# Patient Record
Sex: FEMALE | Race: BLACK OR AFRICAN AMERICAN | NOT HISPANIC OR LATINO | Employment: OTHER | URBAN - METROPOLITAN AREA
[De-identification: names, ages, dates, MRNs, and addresses within clinical notes are randomized per-mention and may not be internally consistent; named-entity substitution may affect disease eponyms.]

---

## 2017-02-22 ENCOUNTER — HISTORICAL (OUTPATIENT)
Dept: RADIOLOGY | Facility: HOSPITAL | Age: 62
End: 2017-02-22

## 2020-12-12 ENCOUNTER — HISTORICAL (OUTPATIENT)
Dept: ADMINISTRATIVE | Facility: HOSPITAL | Age: 65
End: 2020-12-12

## 2021-07-04 ENCOUNTER — HISTORICAL (OUTPATIENT)
Dept: ADMINISTRATIVE | Facility: HOSPITAL | Age: 66
End: 2021-07-04

## 2022-05-03 NOTE — HISTORICAL [facility]
This is a historical note converted from [place]. Formatting and pictures may have been removed.  Please reference [place] for original formatting and attached multimedia. Chief Complaint  Left shoulder pain/throbbing, limited ROM. non traumatic.  History of Present Illness  Left arm and shoulder pain. x one week. reports hx arthritis. reports came to this clinic in december for ankle pain/sprain and was rxed mobic and cyclobenzaprine. the mobic helped. states she had some left but assumed it was .  Review of Systems  Constitutional:?negative except as stated in HPI  Eye:?negative except as stated in HPI  ENMT:?negative except as stated in HPI  Respiratory:?negative except as stated in HPI  Cardiovascular:?negative except as stated in HPI  Gastrointestinal:?negative except as stated in HPI  Genitourinary:?negative except as stated in HPI  Hema/Lymph:?negative except as stated in HPI  Endocrine:?negative except as stated in HPI  Musculoskeletal:?negative except as stated in HPI  Integumentary:?negative except as stated in HPI  Neurologic: negative except as stated in HPI  Physical Exam  Vitals & Measurements  T:?36.4? ?C (Oral)? HR:?92(Peripheral)? RR:?18? BP:?130/86? SpO2:?98%?  HT:?160.00?cm? WT:?74.600?kg? BMI:?29.14?  General:? in no acute distress  Eye: PERRLA, EOMI, clear conjunctiva, eyelids normal  Neck: full range of motion, no thyromegaly  Cardiovascular:?left radial pulse, left UE CMS intact.  Musculoskeletal:?active ROM with lateral arm raise limited to approx 30 degrees. no pain or limitation with anterior arm raise or?posterior flexion. pain with palpation of the deltoid, trapezius, anterior/posterior shoulder joint.  Integumentary: no rashes or skin lesions visible  Neurologic: cranial nerves?grossly?intact, no signs of peripheral neurological deficit  Assessment/Plan  1.?Osteoarthritis?M19.90  ?toradol in clinic. XR shows degenerative changes/arthritis. mobic Rx start TOMORROW. if pain med needed  later today, take tylenol arthritis. pt understands. referred to ortho.  Ordered:  meloxicam, 15 mg = 1 tab(s), Oral, Daily, do not take with ibuprofen, naproxen. take with food and plenty of water., # 30 tab(s), 0 Refill(s), Pharmacy: [facility], 160, cm, Height/Length Dosing, 07/04/21 11:49:00 CDT, 74.6, kg, Weight Dosing, 07/04/21...  injections IM/SQ, 07/04/21 12:07:00 CDT, 07/04/21 12:07:00 CDT, Osteoarthritis  Pain of left shoulder region  Office/Outpatient Visit Level 4 Established [id number] PC, Osteoarthritis  Pain of left shoulder region, 07/04/21 13:09:00 CDT  ?  2.?Pain of left shoulder region?M25.512  ?as above  Ordered:  meloxicam, 15 mg = 1 tab(s), Oral, Daily, do not take with ibuprofen, naproxen. take with food and plenty of water., # 30 tab(s), 0 Refill(s), Pharmacy: [facility], 160, cm, Height/Length Dosing, 07/04/21 11:49:00 CDT, 74.6, kg, Weight Dosing, 07/04/21...  injections IM/SQ, 07/04/21 12:07:00 CDT, 07/04/21 12:07:00 CDT, Osteoarthritis  Pain of left shoulder region  Office/Outpatient Visit Level 4 Established [id number] PC, Osteoarthritis  Pain of left shoulder region, 07/04/21 13:09:00 CDT  ?  Referrals  [facility] Internal Referral to Orthopedics Clinic, Specialty: Sports Medicine, Reason: left shoulder pain, rated 10/10. non traumatic/no injury. sudden onset approx 1 week ago. limited ROM., Type: toradol, mobic, tylenol arthritis, ice/heat, Start: 07/04/21 13:04:00 CDT   Problem List/Past Medical History  Ongoing  DM (diabetes mellitus)  HTN (hypertension)  Hyperlipidemia  Tobacco user  Historical  No qualifying data  Procedure/Surgical History  FESS - Functional endoscopic sinus surgery  Tonsillectomy  Tubal ligation   Medications  amLODIPine 5 mg oral tablet, 5 mg= 1 tab(s), Oral, Daily  atorvastatin 80 mg oral tablet, 80 mg= 1 tab(s), Oral, Daily  glipiZIDE 10 mg oral tablet, 10 mg= 1 tab(s), Oral, BID  HumaLOG KwikPen 100 units/mL injectable solution,  Subcutaneous  hydrochlorothiazide-lisinopril 25 mg-20 mg oral tablet, 1 tab(s), Oral, Daily, 3 refills  Januvia 100 mg oral tablet, 100 mg= 1 tab(s), Oral, Daily  Lantus Solostar Pen 100 units/mL subcutaneous solution, 40 units= 40 units, Subcutaneous, Daily  meloxicam 15 mg oral tablet, 15 mg= 1 tab(s), Oral, Daily  Allergies  penicillins?(Rash)  Social History  Abuse/Neglect  No, No, Yes, 07/04/2021  Alcohol  Past, 04/15/2016  Employment/School  Employed, 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 Exercise type: Walking., 04/15/2016  Financial/Legal Situation  None, 12/12/2020  Home/Environment  Lives with Alone. Living situation: Home/Independent., 04/15/2016    Never in , 12/12/2020  Nutrition/Health  Diabetic, 04/15/2016  Spiritual/Cultural  Anabaptist, 12/12/2020  Substance Use  Never, 04/15/2016  Tobacco  10 or more cigarettes (1/2 pack or more)/day in last 30 days, Cigarettes, No, 20 per day., 07/04/2021  Family History  Acute myocardial infarction.: Mother.  Health Maintenance  Health Maintenance  ???Pending?(in the next year)  ??? ??OverDue  ??? ? ? ?Smoking Cessation due??01/01/21??Variable frequency  ??? ? ? ?Alcohol Misuse Screening due??01/02/21??and every 1??year(s)  ??? ??Due?  ??? ? ? ?Tetanus Vaccine due??06/10/21??and every 10??year(s)  ??? ? ? ?Aspirin Therapy for CVD Prevention due??07/04/21??and every 1??year(s)  ??? ? ? ?Colorectal Screening due??07/04/21??Unknown Frequency  ??? ? ? ?Diabetes Maintenance-Eye Exam due??07/04/21??Unknown Frequency  ??? ? ? ?Diabetes Maintenance-Foot Exam due??07/04/21??Unknown Frequency  ??? ? ? ?Diabetes Maintenance-HgbA1c due??07/04/21??Unknown Frequency  ??? ? ? ?Diabetes Screening due??07/04/21??Unknown Frequency  ??? ? ? ?Hypertension Management-BMP due??07/04/21??Unknown Frequency  ??? ? ? ?Lung Cancer Screening due??07/04/21??and every 1??year(s)  ??? ? ? ?Zoster Vaccine  due??07/04/21??Unknown Frequency  ??? ??Due In Future?  ??? ? ? ?Obesity Screening not due until??01/01/22??and every 1??year(s)  ??? ? ? ?Cervical Cancer Screening not due until??06/30/22??and every 3??year(s)  ???Satisfied?(in the past 1 year)  ??? ??Satisfied?  ??? ? ? ?ADL Screening on??07/04/21.??Satisfied by Madisyn Harris LPN  ??? ? ? ?Alcohol Misuse Screening on??12/12/20.??Satisfied by Ninoska Randolph LPN.  ??? ? ? ?Blood Pressure Screening on??07/04/21.??Satisfied by Madisyn Harris LPN  ??? ? ? ?Body Mass Index Check on??07/04/21.??Satisfied by Madisyn Harris LPN  ??? ? ? ?Depression Screening on??07/04/21.??Satisfied by Madisyn Harris LPN  ??? ? ? ?Hypertension Management-Blood Pressure on??07/04/21.??Satisfied by Madisyn Harris LPN  ??? ? ? ?Obesity Screening on??07/04/21.??Satisfied by Madisyn Harris LPN  ??? ??Postponed?  ??? ? ? ?Tetanus Vaccine on??12/12/20.??Recorded by Ninoska Randolph LPN??Reason: Postpone due to refusal  ??? ??Refused?  ??? ? ? ?Influenza Vaccine on??12/12/20.??Recorded by Ninoska Randolph LPN??Reason: Patient Refuses  ??? ? ? ?Zoster Vaccine on??12/12/20.??Recorded by Ninoska Randolph LPN??Reason: Patient Refuses  ?  Diagnostic Results  (07/04/2021 12:15 CDT XR Shoulder Left Minimum 2 Views)  Radiology Report  EXAMINATION  XR Shoulder Left Minimum 2 Views  ?  INDICATION  non traumatic left shoulder pain with limited ROM  ?  Comparison: There are no comparisons.  ?  FINDINGS  Regional degenerative changes are present.  No convincing acutely displaced fracture or dislocation is identified.  There are no findings indicative of a joint effusion. No aggressive  osseous lesion or periosteal reaction is appreciated.  ?  The included soft tissues are without acute abnormality.  ?  IMPRESSION  1. No acute abnormality.  2. Chronic secondary details discussed above.  ?  Signature Line  Electronically Signed By: Emmanuel Harrell MD  Date/Time Signed:  07/04/2021 12:17 [1]     [1]?XR Shoulder Left Minimum 2 Views; Sharri REAVES, Emmanuel Ling 07/04/2021 12:15 CDT

## 2022-11-25 ENCOUNTER — OFFICE VISIT (OUTPATIENT)
Dept: URGENT CARE | Facility: CLINIC | Age: 67
End: 2022-11-25
Payer: MEDICARE

## 2022-11-25 VITALS
OXYGEN SATURATION: 100 % | HEART RATE: 98 BPM | RESPIRATION RATE: 16 BRPM | DIASTOLIC BLOOD PRESSURE: 83 MMHG | TEMPERATURE: 100 F | SYSTOLIC BLOOD PRESSURE: 121 MMHG | HEIGHT: 62 IN | BODY MASS INDEX: 23.92 KG/M2 | WEIGHT: 130 LBS

## 2022-11-25 DIAGNOSIS — R05.9 COUGH, UNSPECIFIED TYPE: Primary | ICD-10-CM

## 2022-11-25 DIAGNOSIS — U07.1 COVID-19: ICD-10-CM

## 2022-11-25 LAB
CTP QC/QA: YES
MOLECULAR STREP A: NEGATIVE
POC MOLECULAR INFLUENZA A AGN: NEGATIVE
POC MOLECULAR INFLUENZA B AGN: NEGATIVE
SARS-COV-2 RDRP RESP QL NAA+PROBE: POSITIVE

## 2022-11-25 PROCEDURE — 1159F PR MEDICATION LIST DOCUMENTED IN MEDICAL RECORD: ICD-10-PCS | Mod: CPTII,,, | Performed by: PHYSICIAN ASSISTANT

## 2022-11-25 PROCEDURE — 1159F MED LIST DOCD IN RCRD: CPT | Mod: CPTII,,, | Performed by: PHYSICIAN ASSISTANT

## 2022-11-25 PROCEDURE — 4010F PR ACE/ARB THEARPY RXD/TAKEN: ICD-10-PCS | Mod: CPTII,,, | Performed by: PHYSICIAN ASSISTANT

## 2022-11-25 PROCEDURE — 3074F PR MOST RECENT SYSTOLIC BLOOD PRESSURE < 130 MM HG: ICD-10-PCS | Mod: CPTII,,, | Performed by: PHYSICIAN ASSISTANT

## 2022-11-25 PROCEDURE — 3079F DIAST BP 80-89 MM HG: CPT | Mod: CPTII,,, | Performed by: PHYSICIAN ASSISTANT

## 2022-11-25 PROCEDURE — 87635 SARS-COV-2 COVID-19 AMP PRB: CPT | Mod: QW,,, | Performed by: PHYSICIAN ASSISTANT

## 2022-11-25 PROCEDURE — 87502 INFLUENZA DNA AMP PROBE: CPT | Mod: QW,,, | Performed by: PHYSICIAN ASSISTANT

## 2022-11-25 PROCEDURE — 87635: ICD-10-PCS | Mod: QW,,, | Performed by: PHYSICIAN ASSISTANT

## 2022-11-25 PROCEDURE — 87651 POCT STREP A MOLECULAR: ICD-10-PCS | Mod: QW,,, | Performed by: PHYSICIAN ASSISTANT

## 2022-11-25 PROCEDURE — 3074F SYST BP LT 130 MM HG: CPT | Mod: CPTII,,, | Performed by: PHYSICIAN ASSISTANT

## 2022-11-25 PROCEDURE — 87502 POCT INFLUENZA A/B MOLECULAR: ICD-10-PCS | Mod: QW,,, | Performed by: PHYSICIAN ASSISTANT

## 2022-11-25 PROCEDURE — 99203 OFFICE O/P NEW LOW 30 MIN: CPT | Mod: ,,, | Performed by: PHYSICIAN ASSISTANT

## 2022-11-25 PROCEDURE — 99203 PR OFFICE/OUTPT VISIT, NEW, LEVL III, 30-44 MIN: ICD-10-PCS | Mod: ,,, | Performed by: PHYSICIAN ASSISTANT

## 2022-11-25 PROCEDURE — 3008F BODY MASS INDEX DOCD: CPT | Mod: CPTII,,, | Performed by: PHYSICIAN ASSISTANT

## 2022-11-25 PROCEDURE — 4010F ACE/ARB THERAPY RXD/TAKEN: CPT | Mod: CPTII,,, | Performed by: PHYSICIAN ASSISTANT

## 2022-11-25 PROCEDURE — 1160F PR REVIEW ALL MEDS BY PRESCRIBER/CLIN PHARMACIST DOCUMENTED: ICD-10-PCS | Mod: CPTII,,, | Performed by: PHYSICIAN ASSISTANT

## 2022-11-25 PROCEDURE — 3008F PR BODY MASS INDEX (BMI) DOCUMENTED: ICD-10-PCS | Mod: CPTII,,, | Performed by: PHYSICIAN ASSISTANT

## 2022-11-25 PROCEDURE — 87651 STREP A DNA AMP PROBE: CPT | Mod: QW,,, | Performed by: PHYSICIAN ASSISTANT

## 2022-11-25 PROCEDURE — 3079F PR MOST RECENT DIASTOLIC BLOOD PRESSURE 80-89 MM HG: ICD-10-PCS | Mod: CPTII,,, | Performed by: PHYSICIAN ASSISTANT

## 2022-11-25 PROCEDURE — 1160F RVW MEDS BY RX/DR IN RCRD: CPT | Mod: CPTII,,, | Performed by: PHYSICIAN ASSISTANT

## 2022-11-25 RX ORDER — LISINOPRIL AND HYDROCHLOROTHIAZIDE 12.5; 2 MG/1; MG/1
TABLET ORAL
COMMUNITY
Start: 2022-10-28

## 2022-11-25 RX ORDER — BUTALBITAL, ACETAMINOPHEN AND CAFFEINE 50; 325; 40 MG/1; MG/1; MG/1
1 TABLET ORAL EVERY 6 HOURS PRN
Qty: 12 TABLET | Refills: 0 | Status: SHIPPED | OUTPATIENT
Start: 2022-11-25 | End: 2022-11-28

## 2022-11-25 RX ORDER — INSULIN GLARGINE 100 [IU]/ML
20 INJECTION, SOLUTION SUBCUTANEOUS
COMMUNITY
Start: 2022-10-26

## 2022-11-25 RX ORDER — PROMETHAZINE HYDROCHLORIDE AND DEXTROMETHORPHAN HYDROBROMIDE 6.25; 15 MG/5ML; MG/5ML
5 SYRUP ORAL EVERY 8 HOURS PRN
Qty: 118 ML | Refills: 0 | Status: SHIPPED | OUTPATIENT
Start: 2022-11-25 | End: 2022-11-25

## 2022-11-25 RX ORDER — BUTALBITAL, ACETAMINOPHEN AND CAFFEINE 50; 325; 40 MG/1; MG/1; MG/1
1 TABLET ORAL EVERY 6 HOURS PRN
Qty: 12 TABLET | Refills: 0 | Status: SHIPPED | OUTPATIENT
Start: 2022-11-25 | End: 2022-11-25

## 2022-11-25 RX ORDER — ACETAMINOPHEN 500 MG
1000 TABLET ORAL
Status: COMPLETED | OUTPATIENT
Start: 2022-11-25 | End: 2022-11-25

## 2022-11-25 RX ORDER — AMLODIPINE BESYLATE 5 MG/1
5 TABLET ORAL EVERY MORNING
COMMUNITY
Start: 2022-10-28

## 2022-11-25 RX ORDER — GLIPIZIDE 5 MG/1
5 TABLET, FILM COATED, EXTENDED RELEASE ORAL EVERY MORNING
COMMUNITY
Start: 2022-10-28

## 2022-11-25 RX ORDER — ATORVASTATIN CALCIUM 80 MG/1
80 TABLET, FILM COATED ORAL NIGHTLY
COMMUNITY
Start: 2022-10-28

## 2022-11-25 RX ORDER — DULAGLUTIDE 4.5 MG/.5ML
INJECTION, SOLUTION SUBCUTANEOUS
COMMUNITY
Start: 2022-10-26

## 2022-11-25 RX ORDER — PROMETHAZINE HYDROCHLORIDE AND DEXTROMETHORPHAN HYDROBROMIDE 6.25; 15 MG/5ML; MG/5ML
5 SYRUP ORAL EVERY 8 HOURS PRN
Qty: 118 ML | Refills: 0 | Status: SHIPPED | OUTPATIENT
Start: 2022-11-25 | End: 2022-11-30

## 2022-11-25 RX ADMIN — Medication 1000 MG: at 10:11

## 2022-11-25 NOTE — PATIENT INSTRUCTIONS
COVID Positive  Start vitamin C 1000mg twice a day, zinc 100mg once a day, and vitamin D3 at least 2000 units a day. Current CDC guidelines recommend that you quarantine for 5 days starting the day after your symptoms began. Quarantine can end after 5 days as long as the last 24 hours of quarantine you are fever free and there is improvement of all your symptoms. Wear a mask around others for 5 additional days after quarantine. Treat your symptoms as you would the common cold. If you live with anybody, isolate yourself in a separate bedroom and use a separate bathroom. If your symptoms worsen or you develop shortness of breath or a fever over 102.5 , seek medical attention immediately.  Alternate Tylenol and ibuprofen every 6-8 hours as needed for aches pains fever chills.  Phenergan DM sparingly lows dose as needed for severe cough cold congestion.  Fioricet if needed for headache.  Restart Paxlovid today to help reduce viral sick time.  Recommend follow-up with primary care physician in 3-5 days for re-evaluation if not improving.  Recommend emergency department evaluation sooner if symptoms worsen

## 2022-11-25 NOTE — PROGRESS NOTES
"Subjective:       Patient ID: Susi Horn is a 67 y.o. female.    Vitals:  height is 5' 2" (1.575 m) and weight is 59 kg (130 lb). Her temperature is 99.6 °F (37.6 °C). Her blood pressure is 121/83 and her pulse is 98. Her respiration is 16 and oxygen saturation is 100%.     Chief Complaint: Sinus Problem (Pt symptoms started Tuesday, coughing, sneezing, headache, body chills, and possible fever. Previous sore throat.)    HPI  reports having acute URI symptoms over the last 2-3 days now with worsening left temporal headache chills and body aches not improved with over-the-counter Tylenol presents to urgent Care for initial evaluation   Sinus Problem     Additional comments: Pt symptoms started Tuesday, coughing, sneezing,   headache, body chills, and possible fever. Previous sore throat.    Sinus Problem  This is a new problem. The current episode started in the past 7 days. Associated symptoms include chills, congestion, coughing, headaches, sinus pressure and a sore throat. Pertinent negatives include no ear pain, neck pain or shortness of breath. Past treatments include acetaminophen.     Constitution: Positive for chills, fatigue and fever.   HENT:  Positive for congestion, sinus pressure and sore throat. Negative for ear pain, sinus pain, trouble swallowing and voice change.    Neck: Negative for neck pain and neck swelling.   Cardiovascular: Negative.    Respiratory:  Positive for cough. Negative for shortness of breath, stridor and wheezing.    Gastrointestinal: Negative.  Negative for vomiting and diarrhea.   Musculoskeletal:  Positive for muscle ache. Negative for pain, joint pain and back pain.   Skin: Negative.  Negative for erythema.   Allergic/Immunologic: Negative.    Neurological:  Positive for headaches. Negative for altered mental status.   Psychiatric/Behavioral:  Negative for altered mental status.      Objective:      Physical Exam   Constitutional: She is oriented to person, place, and " time. She appears well-developed. She is cooperative.  Non-toxic appearance. She appears ill.      Comments:Awake alert ambulatory nasally congested fatigued female sitting in chair     HENT:   Head: Normocephalic and atraumatic.      Comments: No temporal swelling edema  Ears:   Right Ear: Hearing, tympanic membrane, external ear and ear canal normal.   Left Ear: Hearing, tympanic membrane, external ear and ear canal normal.   Nose: Congestion present. No mucosal edema, rhinorrhea or nasal deformity. No epistaxis. Right sinus exhibits no maxillary sinus tenderness and no frontal sinus tenderness. Left sinus exhibits no maxillary sinus tenderness and no frontal sinus tenderness.   Mouth/Throat: Uvula is midline, oropharynx is clear and moist and mucous membranes are normal. Mucous membranes are moist. No trismus in the jaw. Normal dentition. No uvula swelling. No oropharyngeal exudate, posterior oropharyngeal edema or posterior oropharyngeal erythema.   Eyes: Conjunctivae and lids are normal. No scleral icterus.   Neck: Trachea normal and phonation normal. Neck supple. Carotid bruit is not present. No edema present. No erythema present. No neck rigidity present.   Cardiovascular: Normal rate, regular rhythm, normal heart sounds and normal pulses.   Pulmonary/Chest: Effort normal and breath sounds normal. No respiratory distress. She has no decreased breath sounds. She has no wheezes. She has no rhonchi. She has no rales.   Abdominal: Normal appearance.   Musculoskeletal: Normal range of motion.         General: No swelling. Normal range of motion.      Cervical back: She exhibits no tenderness.   Lymphadenopathy:     She has no cervical adenopathy.   Neurological: no focal deficit. She is alert and oriented to person, place, and time. She exhibits normal muscle tone. Coordination normal.   Skin: Skin is warm, dry, intact, not diaphoretic, not pale and no rash. No erythema   Psychiatric: Her speech is normal and  "behavior is normal. Mood, judgment and thought content normal.   Nursing note and vitals reviewed.         Previous History      Review of patient's allergies indicates:   Allergen Reactions    Penicillins        Past Medical History:   Diagnosis Date    High cholesterol     HTN (hypertension)     Type 2 diabetes mellitus without complications      Current Outpatient Medications   Medication Instructions    amLODIPine (NORVASC) 5 mg, Oral, Every morning    atorvastatin (LIPITOR) 80 mg, Oral, Nightly    butalbital-acetaminophen-caffeine -40 mg (FIORICET, ESGIC) -40 mg per tablet 1 tablet, Oral, Every 6 hours PRN    empagliflozin (JARDIANCE) 25 mg tablet 1 tablet, Oral, Every morning    glipiZIDE 5 mg, Oral, Every morning    insulin 20 Units, Subcutaneous    lisinopriL-hydrochlorothiazide (PRINZIDE,ZESTORETIC) 20-12.5 mg per tablet TAKE ONE TABLET BY MOUTH EVERY DAY AT 9:30 AM    nirmatrelvir-ritonavir 300 mg (150 mg x 2)-100 mg copackaged tablets (EUA) Take 3 tablets by mouth 2 (two) times daily. Each dose contains 2 nirmatrelvir (pink tablets) and 1 ritonavir (white tablet). Take all 3 tablets together    promethazine-dextromethorphan (PROMETHAZINE-DM) 6.25-15 mg/5 mL Syrp 5 mLs, Oral, Every 8 hours PRN    TRULICITY 4.5 mg/0.5 mL pen injector Subcutaneous     Past Surgical History:   Procedure Laterality Date    NO PAST SURGERIES       Family History   Problem Relation Age of Onset    Hypertension Mother     No Known Problems Father     No Known Problems Sister     Diabetes Brother        Social History     Tobacco Use    Smoking status: Every Day     Packs/day: 0.25     Types: Cigarettes    Smokeless tobacco: Never   Substance Use Topics    Alcohol use: Not Currently    Drug use: Never        Physical Exam      Vital Signs Reviewed   /83   Pulse 98   Temp 99.6 °F (37.6 °C)   Resp 16   Ht 5' 2" (1.575 m)   Wt 59 kg (130 lb)   SpO2 100%   BMI 23.78 kg/m²        Procedures    Procedures     " Labs     Results for orders placed or performed in visit on 11/25/22   POCT Influenza A/B MOLECULAR   Result Value Ref Range    POC Molecular Influenza A Ag Negative Negative, Not Reported    POC Molecular Influenza B Ag Negative Negative, Not Reported     Acceptable Yes    POCT Strep A, Molecular   Result Value Ref Range    Molecular Strep A, POC Negative (A) Negative     Acceptable Yes    POCT COVID-19 Rapid Screening   Result Value Ref Range    POC Rapid COVID Positive (A) Negative     Acceptable Yes        Assessment:       1. Cough, unspecified type    2. COVID-19            Plan:     COVID Positive  Start vitamin C 1000mg twice a day, zinc 100mg once a day, and vitamin D3 at least 2000 units a day. Current CDC guidelines recommend that you quarantine for 5 days starting the day after your symptoms began. Quarantine can end after 5 days as long as the last 24 hours of quarantine you are fever free and there is improvement of all your symptoms. Wear a mask around others for 5 additional days after quarantine. Treat your symptoms as you would the common cold. If you live with anybody, isolate yourself in a separate bedroom and use a separate bathroom. If your symptoms worsen or you develop shortness of breath or a fever over 102.5 , seek medical attention immediately.  Alternate Tylenol and ibuprofen every 6-8 hours as needed for aches pains fever chills.  Phenergan DM sparingly lows dose as needed for severe cough cold congestion.  Fioricet if needed for headache.  Restart Paxlovid today to help reduce viral sick time.  Recommend follow-up with primary care physician in 3-5 days for re-evaluation if not improving.  Recommend emergency department evaluation sooner if symptoms worsen    Cough, unspecified type  -     POCT Influenza A/B MOLECULAR  -     POCT Strep A, Molecular  -     POCT COVID-19 Rapid Screening    COVID-19  -     acetaminophen tablet 1,000 mg  -      butalbital-acetaminophen-caffeine -40 mg (FIORICET, ESGIC) -40 mg per tablet; Take 1 tablet by mouth every 6 (six) hours as needed for Pain.  Dispense: 12 tablet; Refill: 0  -     nirmatrelvir-ritonavir 300 mg (150 mg x 2)-100 mg copackaged tablets (EUA); Take 3 tablets by mouth 2 (two) times daily. Each dose contains 2 nirmatrelvir (pink tablets) and 1 ritonavir (white tablet). Take all 3 tablets together  Dispense: 30 tablet; Refill: 0  -     promethazine-dextromethorphan (PROMETHAZINE-DM) 6.25-15 mg/5 mL Syrp; Take 5 mLs by mouth every 8 (eight) hours as needed (cough).  Dispense: 118 mL; Refill: 0

## 2024-05-21 NOTE — PROGRESS NOTES
Patient:   Susi Horn             MRN: [id number]            FIN: [id number]               Age:   65 years     Sex:  Female     :  1955   Associated Diagnoses:   Ankle pain, right   Author:   Kris Harris MD      Chief Complaint   2020 18:09 CST     Right ankle pain since last night, no prior injury        History of Present Illness   65-year-old female with right ankle pain since last night.  She denies any falls, twists, accidents, or other issues.  Pain is located on her right lateral malleolus.  It is painful to flex and extend her foot.  Lateral malleolus is also swollen.  She tried over-the-counter medication with little improvement.      Review of Systems   Constitutional:  Negative except as documented in history of present illness.    Ear/Nose/Mouth/Throat:  Negative except as documented in history of present illness.    Respiratory:  Negative except as documented in history of present illness.    Cardiovascular:  Negative except as documented in history of present illness.    Musculoskeletal:  Negative except as documented in history of present illness.       Health Status   Allergies:    Allergic Reactions (Selected)  Severity Not Documented  Penicillins- Rash.,    Allergies (1) Active Reaction  penicillins Rash     Current medications:  (Selected)   Outpatient Medications  Ordered  Toradol for IM: 60 mg, form: Injection, IM, Once, first dose 20 19:00:00 CST, stop date 20 19:00:00 CST  Prescriptions  Prescribed  atorvastatin 20 mg oral tablet: 20 mg = 1 tab(s), Oral, Once a day (at bedtime), # 90 tab(s), 3 Refill(s), Pharmacy: [id number]  cyclobenzaprine 10 mg oral tablet: 10 mg = 1 tab(s), Oral, TID, PRN PRN as needed for spasm, # 30 tab(s), 1 Refill(s), Pharmacy: [facility], 160, cm, Height/Length Dosing, 20 18:12:00 CST, 76.5, kg, Weight Dosing, 20 18:12:00 CST  hydrochlorothiazide-lisinopril 25 mg-20 mg oral tablet: 1 tab(s), Oral,  Daily, # 90 tab(s), 3 Refill(s), Pharmacy: [id number]  meloxicam 15 mg oral tablet: 15 mg = 1 tab(s), Oral, Daily, # 30 tab(s), 0 Refill(s), Pharmacy: [facility], 160, cm, Height/Length Dosing, 20 18:12:00 CST, 76.5, kg, Weight Dosing, 20 18:12:00 CST  metformin 500 mg oral tablet: 500 mg = 1 tab(s), Oral, BID, # 180 tab(s), 3 Refill(s), Pharmacy: [id number]  Documented Medications  Documented  HumaLOG KwikPen 100 units/mL injectable solution: Subcutaneous  Januvia 100 mg oral tablet: 100 mg = 1 tab(s), Oral, Daily  Lantus Solostar Pen 100 units/mL subcutaneous solution: 40 units = 40 units, Subcutaneous, Daily  glipiZIDE 10 mg oral tablet: 10 mg = 1 tab(s), Oral, BID,    Home Medications (9) Active  atorvastatin 20 mg oral tablet 20 mg = 1 tab(s), Oral, Once a day (at bedtime)  cyclobenzaprine 10 mg oral tablet 10 mg = 1 tab(s), PRN, Oral, TID  glipiZIDE 10 mg oral tablet 10 mg = 1 tab(s), Oral, BID  HumaLOG KwikPen 100 units/mL injectable solution , Subcutaneous  hydrochlorothiazide-lisinopril 25 mg-20 mg oral tablet 1 tab(s), Oral, Daily  Januvia 100 mg oral tablet 100 mg = 1 tab(s), Oral, Daily  Lantus Solostar Pen 100 units/mL subcutaneous solution 40 units = 40 units, Subcutaneous, Daily  meloxicam 15 mg oral tablet 15 mg = 1 tab(s), Oral, Daily  metformin 500 mg oral tablet 500 mg = 1 tab(s), Oral, BID     Problem list:    All Problems  DM (diabetes mellitus) / SNOMED CT [id number] / Confirmed  HTN (hypertension) / SNOMED CT [id number] / Confirmed  Hyperlipidemia / SNOMED CT 38038626 / Confirmed  Tobacco user / SNOMED CT [id number] / Probable      Histories   Past Medical History:    No active or resolved past medical history items have been selected or recorded.   Family History:    Acute myocardial infarction.  Mother ()     Procedure history:    FESS - Functional endoscopic sinus surgery  ([id number]).  Tonsillectomy ([id number]).  Tubal ligation ([id number]).   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    Exercise  04/15/2016  Times per week: 5-6 times/week    Exercise type: Walking    Home/Environment  04/15/2016  Lives with: Alone    Living situation: Home/Independent    Nutrition/Health  04/15/2016  Type of diet: Diabetic    Substance Use  04/15/2016  Use: Never    Tobacco  12/12/2020  Use: 5-9 cigarettes (between 1    Type: Cigarettes    Patient Wants Consult For Cessation Counseling No    Abuse/Neglect  12/12/2020  [facility] Any signs of abuse or neglect No    Feels unsafe at home: No    Safe place to go: Yes    Spiritual/Cultural  12/12/2020  Hinduism Preference Judaism    Financial/Legal Situation  12/12/2020  Financial Issues None      12/12/2020  Branch of  Never in   .        Physical Examination   Vital Signs   12/12/2020 18:09 CST     Temperature Oral          36.6 DegC                             Temperature Oral (calculated)             97.88 DegF                             Peripheral Pulse Rate     82 bpm                             Respiratory Rate          20 br/min                             SpO2                      98 %                             Oxygen Therapy            Room air                             Systolic Blood Pressure   137 mmHg                             Diastolic Blood Pressure  82 mmHg                             Blood Pressure Location   Left arm                             Manual Cuff BP            No                             O2 SAT at rest            98 %     Measurements from flowsheet : Measurements   12/12/2020 18:09 CST     Weight Dosing             76.500 kg                             Weight Measured           76.5 kg                             Weight Measured and Calculated in Lbs     168.65 lb                              Height/Length Dosing      160.00 cm                             Height/Length Measured    160 cm                             BSA Measured              1.84 m2                             Body Mass Index Measured  29.88 kg/m2     General:  Alert and oriented, No acute distress.    Eye:  Extraocular movements are intact, Normal conjunctiva.    HENT:  Normocephalic.    Musculoskeletal:  Right lateral malleolus is exquisitely tender to palpation.  There is mild lateral edema and pain on active and passive dorsi and plantar flexion..    Integumentary:  Warm, Dry.    Neurologic:  Alert, Oriented, No focal deficits.    Psychiatric:  Cooperative, Appropriate mood & affect.       Impression and Plan   Diagnosis     Ankle pain, right ([id number] M25.571).     Meloxicam and cyclobenzaprine as prescribed  Ace wrap applied ankle today  Rice therapy  ER precautions  Follow-up with PCP    CHRISD